# Patient Record
(demographics unavailable — no encounter records)

---

## 2025-05-08 NOTE — HISTORY OF PRESENT ILLNESS
[Complications:___] : no complications [Breastfeeding] : not currently nursing [Cervix Sample Taken] : cervical sample not taken for a Pap smear [FreeTextEntry8] : lmp 4/14/25 occasionally using condoms, upreg (-) [de-identified] : f/u for annual or prn